# Patient Record
Sex: MALE | Race: WHITE | NOT HISPANIC OR LATINO | Employment: OTHER | URBAN - NONMETROPOLITAN AREA
[De-identification: names, ages, dates, MRNs, and addresses within clinical notes are randomized per-mention and may not be internally consistent; named-entity substitution may affect disease eponyms.]

---

## 2018-08-22 ENCOUNTER — OFFICE VISIT CONVERTED (OUTPATIENT)
Dept: SURGERY | Facility: CLINIC | Age: 83
End: 2018-08-22
Attending: PHYSICIAN ASSISTANT

## 2018-09-19 ENCOUNTER — CONVERSION ENCOUNTER (OUTPATIENT)
Dept: SURGERY | Facility: CLINIC | Age: 83
End: 2018-09-19

## 2018-09-19 ENCOUNTER — OFFICE VISIT CONVERTED (OUTPATIENT)
Dept: SURGERY | Facility: CLINIC | Age: 83
End: 2018-09-19
Attending: PHYSICIAN ASSISTANT

## 2019-02-27 ENCOUNTER — OFFICE VISIT CONVERTED (OUTPATIENT)
Dept: SURGERY | Facility: CLINIC | Age: 84
End: 2019-02-27
Attending: PHYSICIAN ASSISTANT

## 2019-10-01 ENCOUNTER — HOSPITAL ENCOUNTER (OUTPATIENT)
Dept: OTHER | Facility: HOSPITAL | Age: 84
Discharge: HOME | End: 2019-10-01
Attending: PHYSICIAN ASSISTANT

## 2019-10-01 LAB — PSA SERPL-MCNC: 0.03 NG/ML (ref 0–4)

## 2020-04-06 ENCOUNTER — TELEMEDICINE CONVERTED (OUTPATIENT)
Dept: UROLOGY | Facility: CLINIC | Age: 85
End: 2020-04-06
Attending: UROLOGY

## 2020-10-09 ENCOUNTER — HOSPITAL ENCOUNTER (OUTPATIENT)
Dept: OTHER | Facility: HOSPITAL | Age: 85
Discharge: HOME | End: 2020-10-09
Attending: UROLOGY

## 2020-10-09 LAB — PSA SERPL-MCNC: 0.04 NG/ML (ref 0–4)

## 2020-10-15 ENCOUNTER — TELEPHONE CONVERTED (OUTPATIENT)
Dept: UROLOGY | Facility: CLINIC | Age: 85
End: 2020-10-15
Attending: UROLOGY

## 2021-05-12 NOTE — PROGRESS NOTES
Quick Note      Patient Name: Gianfranco Sanabria   Patient ID: [id number]   Sex: Male   YOB: 1935    Primary Care Provider: Moise Matias MD   Referring Provider: Moise Matias MD    Visit Date: April 6, 2020    Provider: Oswaldo Reese MD   Location: [facility]   Location Address: [address] [place] [address]  [id number]   Location Phone: [phone]          History Of Present Illness  TELEHEALTH VISIT  Chief Complaint: elevated PSA   Gianfranco Sanabria is a 84 year old /White male who is presenting for evaluation via telehealth visit. Verbal consent obtained before beginning visit.   Provider spent 11 minutes with the patient during telehealth visit.   The following staff were present during this visit: Melissa Padilla   Past Medical History/Overview of Patient Symptoms       HPI    84-year-old  gentleman history of prostate cancer status post prostatectomy in 2011 by Dr. Hemphill dealing with urge incontinence mainly at night      Patient is currently taking oxybutynin 10 mg XL daily.  He does have some trouble with constipation but does okay when he takes MiraLAX daily.    Nocturia 2-3 times, daytime doing better.  Still bothered at night but overall doing okay.  Wears 2 pads per 24-hour period.  No stress urinary incontinence    Prostate cancer    10/19 0.03  7/18  0.01           Assessment  · Prostate cancer     185/C61  · Urge incontinence     788.31/N39.41      Plan  · Orders  o Physician Telephone Evaluation, 11-20 minutes (85523) - 185/C61, 788.31/N39.41 - 04/06/2020  o PSA ultrasensitive DIAGNOSTIC [facility] (00182) - 185/C61, 788.31/N39.41 - 10/06/2020  · Medications  o Medications have been Reconciled  o Transition of Care or Provider Policy  · Instructions  o Plan Of Care:   o Electronically Identified Patient Education Materials Provided Electronically       Prostate cancer    Will follow patient up with Dr. Hemphill in 10/20 with a PSA before.  Discussed with patient  if at that time the coronavirus is still bad he could put this off a few more months.  We discussed risk and benefits of this    Urge incontinence/nocturia    Doing better on oxybutynin 10 mg XL.  Did discuss this medication is not the best for someone in their 80s but because he is already taking it and doing okay we will leave alone at this time.  I do not want to change his medication or do anything different at this time due to the coronavirus pandemic.  Patient voiced understanding  Risk and benefits of this medication were discussed.    Refill for 1 year             Electronically Signed by: Oswaldo Reese MD -Author on April 6, 2020 11:22:17 AM

## 2021-05-13 NOTE — PROGRESS NOTES
Progress Note      Patient Name: Gianfranco Sanabria   Patient ID: [id number]   Sex: Male   YOB: 1935    Primary Care Provider: Moise Matias MD   Referring Provider: Moise Matias MD    Visit Date: October 15, 2020    Provider: Madhavi Hemphill MD   Location: [facility]   Location Address: [address] [place] [address]  [id number]   Location Phone: [phone]          History Of Present Illness  TELEHEALTH VISIT  Chief Complaint: Incontinence   Gianfranco Sanabria is a 85 year old /White male who is presenting for evaluation via telehealth visit. Verbal consent obtained before beginning visit.   Provider spent 11 minutes with the patient during telehealth visit.   The following staff were present during this visit: Naty Marie and Madhavi Hemphill MD   Past Medical History/Overview of Patient Symptoms       HPI    84-year-old  gentleman history of prostate cancer status post prostatectomy in 2011 by Dr. Hemphill dealing with urge incontinence mainly at night      Patient is currently taking oxybutynin 10 mg XL daily.  He does have some trouble with constipation but does okay when he takes MiraLAX daily.  He does not feel it is working as well and he would like to try another med instead.      Nocturia 2-3 times, daytime doing better.  Still bothered at night but overall doing okay.  Wears 2 pads per 24-hour period.  No stress urinary incontinence.    Prostate cancer    10/20   0.04  10/19   0.03  7/18     0.01       Past Medical History  Erectile Dysfunction; H/O prostate cancer; Overactive bladder; Prostate cancer; Urinary Incontinence         Past Surgical History  Hand surgery, left; Hernia Repair; Prostate Biopsy; Radical Prostatectomy         Medication List  furosemide 20 mg oral tablet; Miralax oral; oxybutynin chloride 10 mg oral tablet extended release 24hr         Allergy List  NO KNOWN DRUG ALLERGIES       Allergies Reconciled  Family Medical  History  *Unremarkable         Social History  Tobacco (Former)                 Assessment  · Prostate cancer     185/C61  · Urge incontinence     788.31/N39.41      Plan  · Orders  o Physician Telephone Evaluation, 11-20 minutes (38134) - 185/C61, 788.31/N39.41 - 10/15/2020  · Medications  o Myrbetriq 50 mg oral tablet extended release 24 hr   SIG: take 1 tablet (50 mg) by oral route once daily swallowing whole with water. Do not crush, chew and/or divide. for 90 days   DISP: (90) Tablet with 3 refills  Prescribed on 10/15/2020     o Medications have been Reconciled  o Transition of Care or Provider Policy       Prostate cancer    PSA is stable and fine.  It is 0.04.  We will recheck in one year.        Urge incontinence/nocturia    He would like to try Myrbetriq instead.  If it is too expensive he will continue Oxybutynin ER 10mg.        Refill for 1 year             Electronically Signed by: Madhavi Hemphill MD -Author on October 15, 2020 04:17:16 PM

## 2021-05-16 VITALS — RESPIRATION RATE: 14 BRPM | WEIGHT: 226.37 LBS | HEIGHT: 71 IN | BODY MASS INDEX: 31.69 KG/M2

## 2021-05-16 VITALS — WEIGHT: 233.25 LBS | BODY MASS INDEX: 32.65 KG/M2 | HEIGHT: 71 IN | RESPIRATION RATE: 16 BRPM

## 2021-05-16 VITALS — HEIGHT: 71 IN | RESPIRATION RATE: 14 BRPM | WEIGHT: 228 LBS | BODY MASS INDEX: 31.92 KG/M2

## 2021-10-13 DIAGNOSIS — C61 PROSTATE CANCER (HCC): Primary | ICD-10-CM

## 2021-10-14 ENCOUNTER — LAB (OUTPATIENT)
Dept: LAB | Facility: HOSPITAL | Age: 86
End: 2021-10-14

## 2021-10-14 DIAGNOSIS — C61 PROSTATE CANCER (HCC): ICD-10-CM

## 2021-10-14 LAB — PSA SERPL-MCNC: 0.06 NG/ML (ref 0–4)

## 2021-10-14 PROCEDURE — 36415 COLL VENOUS BLD VENIPUNCTURE: CPT

## 2021-10-14 PROCEDURE — 84153 ASSAY OF PSA TOTAL: CPT

## 2021-10-21 RX ORDER — FUROSEMIDE 20 MG/1
TABLET ORAL
COMMUNITY

## 2021-10-21 RX ORDER — OXYBUTYNIN CHLORIDE 10 MG/1
TABLET, EXTENDED RELEASE ORAL
COMMUNITY

## 2021-10-25 ENCOUNTER — OFFICE VISIT (OUTPATIENT)
Dept: UROLOGY | Facility: CLINIC | Age: 86
End: 2021-10-25

## 2021-10-25 VITALS
WEIGHT: 195.8 LBS | SYSTOLIC BLOOD PRESSURE: 134 MMHG | BODY MASS INDEX: 27.41 KG/M2 | HEIGHT: 71 IN | DIASTOLIC BLOOD PRESSURE: 72 MMHG

## 2021-10-25 DIAGNOSIS — R19.09 MASS OF RIGHT INGUINAL REGION: ICD-10-CM

## 2021-10-25 DIAGNOSIS — C61 PROSTATE CANCER (HCC): Primary | ICD-10-CM

## 2021-10-25 DIAGNOSIS — K40.91 UNILATERAL RECURRENT INGUINAL HERNIA WITHOUT OBSTRUCTION OR GANGRENE: ICD-10-CM

## 2021-10-25 LAB
BILIRUB BLD-MCNC: ABNORMAL MG/DL
CLARITY, POC: CLEAR
COLOR UR: YELLOW
EXPIRATION DATE: ABNORMAL
GLUCOSE UR STRIP-MCNC: NEGATIVE MG/DL
KETONES UR QL: ABNORMAL
LEUKOCYTE EST, POC: NEGATIVE
Lab: ABNORMAL
NITRITE UR-MCNC: NEGATIVE MG/ML
PH UR: 5.5 [PH] (ref 5–8)
PROT UR STRIP-MCNC: NEGATIVE MG/DL
RBC # UR STRIP: ABNORMAL /UL
SP GR UR: 1.02 (ref 1–1.03)
UROBILINOGEN UR QL: NORMAL

## 2021-10-25 PROCEDURE — 99213 OFFICE O/P EST LOW 20 MIN: CPT | Performed by: UROLOGY

## 2021-10-25 PROCEDURE — 81003 URINALYSIS AUTO W/O SCOPE: CPT | Performed by: UROLOGY

## 2021-10-25 NOTE — PROGRESS NOTES
"Chief Complaint  Annual Exam (PSA Results)    Subjective          Gianfranco Sanabria presents to [facility]  84-year-old  gentleman history of prostate cancer status post prostatectomy in 2011 by Dr. Hemphill dealing with urge incontinence mainly at night    Patient is currently taking oxybutynin 10 mg XL daily.  He does have some trouble with constipation but does okay when he takes MiraLAX daily.    Nocturia 2-3 times, daytime doing better.  Still bothered at night but overall doing okay.  Wears 2 pads per 24-hour period.  No stress urinary incontinence.    He does complain of what he describes as a rupture in his private area.  It is not painful but there is a mass above his right testicle.  He states it has been there for quite a while and he is not sure what it is.    PSA  -----------------------  10/21   0.062  10/20   0.04  10/19   0.03  7/18     0.01      Objective   Vital Signs:   /72   Ht 180.3 cm (71\")   Wt 88.8 kg (195 lb 12.8 oz)   BMI 27.31 kg/m²     Physical Exam  Vitals and nursing note reviewed.   Constitutional:       Appearance: Normal appearance. He is well-developed.   Pulmonary:      Effort: Pulmonary effort is normal.      Breath sounds: Normal air entry.   Genitourinary:     Comments: Possible hernia which is nontender.  There is definitely a mass in the upper right scrotum right inguinal canal which is not related to the testicle itself.  It is nontender.  It is hard.  Neurological:      Mental Status: He is alert and oriented to person, place, and time.      Motor: Motor function is intact.   Psychiatric:         Mood and Affect: Mood normal.         Behavior: Behavior normal.        Result Review :                  Results for orders placed or performed in visit on 10/25/21   POC Urinalysis Dipstick, Automated    Specimen: Urine   Result Value Ref Range    Color Yellow Yellow, Straw, Dark Yellow, Tess    Clarity, UA Clear Clear    Specific Gravity  1.025 " 1.005 - 1.030    pH, Urine 5.5 5.0 - 8.0    Leukocytes Negative Negative    Nitrite, UA Negative Negative    Protein, POC Negative Negative mg/dL    Glucose, UA Negative Negative, 1000 mg/dL (3+) mg/dL    Ketones, UA Trace (A) Negative    Urobilinogen, UA Normal Normal    Bilirubin Small (1+) (A) Negative    Blood, UA Moderate (A) Negative    Lot Number [id number]     Expiration Date 09/30/2022        Assessment and Plan    Diagnoses and all orders for this visit:    1. Prostate cancer (HCC) (Primary)  Assessment & Plan:  PSA again in 1 year.  Currently undetectable.    Orders:  -     POC Urinalysis Dipstick, Automated  -     Cancel: CT Abdomen Pelvis With & Without Contrast; Future  -     CT Abdomen Pelvis With & Without Contrast; Future  -     PSA DIAGNOSTIC; Future    2. Unilateral recurrent inguinal hernia without obstruction or gangrene  -     Cancel: CT Abdomen Pelvis With & Without Contrast; Future  -     CT Abdomen Pelvis With & Without Contrast; Future    3. Mass of right inguinal region  -     Cancel: CT Abdomen Pelvis With & Without Contrast; Future  -     CT Abdomen Pelvis With & Without Contrast; Future      Follow Up   No follow-ups on file.  Patient was given instructions and counseling regarding his condition or for health maintenance advice. Please see specific information pulled into the AVS if appropriate.

## 2021-11-04 ENCOUNTER — HOSPITAL ENCOUNTER (OUTPATIENT)
Dept: CT IMAGING | Facility: HOSPITAL | Age: 86
Discharge: HOME | End: 2021-11-04
Admitting: UROLOGY

## 2021-11-04 DIAGNOSIS — R19.09 MASS OF RIGHT INGUINAL REGION: ICD-10-CM

## 2021-11-04 DIAGNOSIS — C61 PROSTATE CANCER (HCC): ICD-10-CM

## 2021-11-04 DIAGNOSIS — K40.91 UNILATERAL RECURRENT INGUINAL HERNIA WITHOUT OBSTRUCTION OR GANGRENE: ICD-10-CM

## 2021-11-04 LAB — CREAT BLDA-MCNC: 1.2 MG/DL

## 2021-11-04 PROCEDURE — 0 IOPAMIDOL PER 1 ML: Performed by: UROLOGY

## 2021-11-04 PROCEDURE — 74178 CT ABD&PLV WO CNTR FLWD CNTR: CPT

## 2021-11-04 PROCEDURE — 82565 ASSAY OF CREATININE: CPT

## 2021-11-04 RX ADMIN — IOPAMIDOL 100 ML: 755 INJECTION, SOLUTION INTRAVENOUS at 15:23

## 2021-11-05 ENCOUNTER — TELEPHONE (OUTPATIENT)
Dept: SURGERY | Facility: CLINIC | Age: 86
End: 2021-11-05

## 2021-11-05 NOTE — TELEPHONE ENCOUNTER
----- Message from Naty Marie sent at 11/5/2021  8:19 AM EDT -----  Would you mind to call patient and arrange this appointment with ?   ----- Message -----  From: Madhavi Hemphill MD  Sent: 11/4/2021   6:59 PM EDT  To: Naty Frank    He has an inguinal hernia.  I called patient and spoke to Aleah.  He needs an appt to see Aleah for hernia repair.  Can you send to Keri or whoever to schedule him?  He knows we will be calling with appt.

## 2021-12-02 ENCOUNTER — OFFICE VISIT (OUTPATIENT)
Dept: SURGERY | Facility: CLINIC | Age: 86
End: 2021-12-02

## 2021-12-02 VITALS — HEIGHT: 71 IN | BODY MASS INDEX: 27.16 KG/M2 | WEIGHT: 194 LBS

## 2021-12-02 DIAGNOSIS — K40.91 UNILATERAL RECURRENT INGUINAL HERNIA WITHOUT OBSTRUCTION OR GANGRENE: Primary | ICD-10-CM

## 2021-12-02 PROCEDURE — 99203 OFFICE O/P NEW LOW 30 MIN: CPT | Performed by: SURGERY

## 2021-12-02 NOTE — PROGRESS NOTES
"Chief Complaint: Hernia (right inguinal)    Subjective         History of Present Illness  Gianfranco Sanabria is a 86 y.o. male presents to [facility] to be seen for inguinal hernia.  Patient reports he has had inguinal hernia for some years.  He reports that the hernia has been there at least 2 years.  He reports he has occasional pain but very minimal and very intermittent.  He reports really only one severe episode.  He reports that he has issues with constipation but has had issues with constipation for many years.  Most of the history was obtained with the aid of his family members at bedside.    Objective     Past Medical History:   Diagnosis Date   • Erectile dysfunction    • Overactive bladder    • Prostate cancer (HCC)    • Urinary incontinence        Past Surgical History:   Procedure Laterality Date   • HAND SURGERY Left    • PROSTATE BIOPSY     • PROSTATECTOMY  2011         Current Outpatient Medications:   •  furosemide (LASIX) 20 MG tablet, , Disp: , Rfl:   •  oxybutynin XL (DITROPAN-XL) 10 MG 24 hr tablet, , Disp: , Rfl:     No Known Allergies     Family History   Problem Relation Age of Onset   • Heart disease Mother    • Heart attack Father    • Breast cancer Sister        Social History     Socioeconomic History   • Marital status:    Tobacco Use   • Smoking status: Former Smoker   • Smokeless tobacco: Never Used   Vaping Use   • Vaping Use: Never used   Substance and Sexual Activity   • Sexual activity: Defer       Vital Signs:   Ht 180.3 cm (71\")   Wt 88 kg (194 lb)   BMI 27.06 kg/m²      Physical Exam  Constitutional:       Appearance: Normal appearance.   Cardiovascular:      Rate and Rhythm: Normal rate.   Pulmonary:      Effort: Pulmonary effort is normal.   Abdominal:      Palpations: Abdomen is soft.   Skin:     General: Skin is warm.     Large partially reducible right inguinal hernia    Result Review :            Procedures        Assessment and Plan  "   Diagnoses and all orders for this visit:    1. Unilateral recurrent inguinal hernia without obstruction or gangrene (Primary)        Follow Up   No follow-ups on file.  Patient was given instructions and counseling regarding his condition or for health maintenance advice. Please see specific information pulled into the AVS if appropriate.     Discussed with the patient and his family members at length.  We discussed the pros and cons of a potential operation.  We discussed the downside to a potential operation given his age and current health status and the need for recovery.  We discussed the downside of waiting for hernia intervention.  We did discuss the possibility of incarceration or bowel obstruction or strangulation.    We discussed options for hernia repair as well, we discussed robotic versus open inguinal hernia repair.  The patient did have a robotic prostatectomy.  But he believes that was about 11 years ago.  I have recommended an open hernia repair given his current health status and the potential for a spinal which would likely be safer than a general anesthetic.    After lengthy discussion back-and-forth the patient decided that he wants to hold off on any surgical intervention at this point.  If he starts to develop further symptoms or worsening symptoms we can always revisit the potential for surgery.  This was discussed with his family is bedside as well and they have agreed    Discussed with the patient - all questions were answered they voiced understanding and agreed to proceed with above plan

## 2022-11-03 ENCOUNTER — TELEPHONE (OUTPATIENT)
Dept: UROLOGY | Facility: CLINIC | Age: 87
End: 2022-11-03

## 2022-11-03 NOTE — TELEPHONE ENCOUNTER
LMOM for pt, he needs a follow up appt with Dr Hemphill for his annual and his PSA. To call office back if he wants to schedule that.

## 2022-12-27 ENCOUNTER — TELEPHONE (OUTPATIENT)
Dept: UROLOGY | Facility: CLINIC | Age: 87
End: 2022-12-27

## 2022-12-27 NOTE — TELEPHONE ENCOUNTER
LMOM for pt to call office back to see if he would like to schedule his annual follow up with Dr Hemphill and that his PSA is due as well.